# Patient Record
Sex: MALE | Race: WHITE | Employment: FULL TIME | URBAN - METROPOLITAN AREA
[De-identification: names, ages, dates, MRNs, and addresses within clinical notes are randomized per-mention and may not be internally consistent; named-entity substitution may affect disease eponyms.]

---

## 2022-10-03 SDOH — HEALTH STABILITY: PHYSICAL HEALTH: ON AVERAGE, HOW MANY MINUTES DO YOU ENGAGE IN EXERCISE AT THIS LEVEL?: 150+ MIN

## 2022-10-03 SDOH — HEALTH STABILITY: PHYSICAL HEALTH: ON AVERAGE, HOW MANY DAYS PER WEEK DO YOU ENGAGE IN MODERATE TO STRENUOUS EXERCISE (LIKE A BRISK WALK)?: 5 DAYS

## 2022-10-03 ASSESSMENT — SOCIAL DETERMINANTS OF HEALTH (SDOH)

## 2022-10-04 ENCOUNTER — OFFICE VISIT (OUTPATIENT)
Dept: PRIMARY CARE CLINIC | Age: 47
End: 2022-10-04
Payer: COMMERCIAL

## 2022-10-04 VITALS
DIASTOLIC BLOOD PRESSURE: 74 MMHG | SYSTOLIC BLOOD PRESSURE: 102 MMHG | TEMPERATURE: 98.1 F | RESPIRATION RATE: 14 BRPM | OXYGEN SATURATION: 100 % | BODY MASS INDEX: 22.41 KG/M2 | WEIGHT: 184 LBS | HEART RATE: 67 BPM | HEIGHT: 76 IN
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
## 2022-10-04 DIAGNOSIS — Z72.0 TOBACCO ABUSE: ICD-10-CM

## 2022-10-04 DIAGNOSIS — S50.861A INSECT BITE OF RIGHT FOREARM, INITIAL ENCOUNTER: Primary | ICD-10-CM

## 2022-10-04 DIAGNOSIS — L98.9 SKIN LESION OF LEFT LOWER EXTREMITY: ICD-10-CM

## 2022-10-04 PROCEDURE — 99204 OFFICE O/P NEW MOD 45 MIN: CPT | Performed by: STUDENT IN AN ORGANIZED HEALTH CARE EDUCATION/TRAINING PROGRAM

## 2022-10-04 RX ORDER — CLINDAMYCIN PHOSPHATE 10 MG/G
GEL TOPICAL
Qty: 1 EACH | Refills: 0 | Status: SHIPPED | OUTPATIENT
Start: 2022-10-04 | End: 2022-10-11

## 2022-10-04 SDOH — ECONOMIC STABILITY: FOOD INSECURITY: WITHIN THE PAST 12 MONTHS, YOU WORRIED THAT YOUR FOOD WOULD RUN OUT BEFORE YOU GOT MONEY TO BUY MORE.: NEVER TRUE

## 2022-10-04 SDOH — ECONOMIC STABILITY: FOOD INSECURITY: WITHIN THE PAST 12 MONTHS, THE FOOD YOU BOUGHT JUST DIDN'T LAST AND YOU DIDN'T HAVE MONEY TO GET MORE.: NEVER TRUE

## 2022-10-04 ASSESSMENT — ANXIETY QUESTIONNAIRES
5. BEING SO RESTLESS THAT IT IS HARD TO SIT STILL: 1
GAD7 TOTAL SCORE: 3
1. FEELING NERVOUS, ANXIOUS, OR ON EDGE: 0
2. NOT BEING ABLE TO STOP OR CONTROL WORRYING: 0
4. TROUBLE RELAXING: 1
6. BECOMING EASILY ANNOYED OR IRRITABLE: 0
3. WORRYING TOO MUCH ABOUT DIFFERENT THINGS: 1
IF YOU CHECKED OFF ANY PROBLEMS ON THIS QUESTIONNAIRE, HOW DIFFICULT HAVE THESE PROBLEMS MADE IT FOR YOU TO DO YOUR WORK, TAKE CARE OF THINGS AT HOME, OR GET ALONG WITH OTHER PEOPLE: NOT DIFFICULT AT ALL
7. FEELING AFRAID AS IF SOMETHING AWFUL MIGHT HAPPEN: 0

## 2022-10-04 ASSESSMENT — ENCOUNTER SYMPTOMS
WHEEZING: 0
ABDOMINAL PAIN: 0
SHORTNESS OF BREATH: 0

## 2022-10-04 ASSESSMENT — PATIENT HEALTH QUESTIONNAIRE - PHQ9
3. TROUBLE FALLING OR STAYING ASLEEP: 0
1. LITTLE INTEREST OR PLEASURE IN DOING THINGS: 0
10. IF YOU CHECKED OFF ANY PROBLEMS, HOW DIFFICULT HAVE THESE PROBLEMS MADE IT FOR YOU TO DO YOUR WORK, TAKE CARE OF THINGS AT HOME, OR GET ALONG WITH OTHER PEOPLE: 0
SUM OF ALL RESPONSES TO PHQ QUESTIONS 1-9: 1
SUM OF ALL RESPONSES TO PHQ QUESTIONS 1-9: 1
4. FEELING TIRED OR HAVING LITTLE ENERGY: 0
8. MOVING OR SPEAKING SO SLOWLY THAT OTHER PEOPLE COULD HAVE NOTICED. OR THE OPPOSITE, BEING SO FIGETY OR RESTLESS THAT YOU HAVE BEEN MOVING AROUND A LOT MORE THAN USUAL: 1
7. TROUBLE CONCENTRATING ON THINGS, SUCH AS READING THE NEWSPAPER OR WATCHING TELEVISION: 0
6. FEELING BAD ABOUT YOURSELF - OR THAT YOU ARE A FAILURE OR HAVE LET YOURSELF OR YOUR FAMILY DOWN: 0
SUM OF ALL RESPONSES TO PHQ9 QUESTIONS 1 & 2: 0
5. POOR APPETITE OR OVEREATING: 0
SUM OF ALL RESPONSES TO PHQ QUESTIONS 1-9: 1
9. THOUGHTS THAT YOU WOULD BE BETTER OFF DEAD, OR OF HURTING YOURSELF: 0
2. FEELING DOWN, DEPRESSED OR HOPELESS: 0
SUM OF ALL RESPONSES TO PHQ QUESTIONS 1-9: 1

## 2022-10-04 ASSESSMENT — SOCIAL DETERMINANTS OF HEALTH (SDOH): HOW HARD IS IT FOR YOU TO PAY FOR THE VERY BASICS LIKE FOOD, HOUSING, MEDICAL CARE, AND HEATING?: NOT HARD AT ALL

## 2022-10-04 NOTE — PROGRESS NOTES
Haleigh Arenas (:  1975) is a 56 y.o. male,New patient, here for evaluation of the following chief complaint(s):  New Patient (To establish care ), Insect Bite (Right antecubital space, feels the hole is getting bigger, has had it for 2 weeks ), and Mass (Left side knee area, very painful, getting more sensitive but not getting bigger /)      Patient presents today to establish care and with complaints of insect bite and skin lesion. Patient reports PMHx of depression (diagnosed 17 years ago but he's gotten over the hump). Surgical history reviewed. Family history reviewed. Patient smokes 0.5 PPD, denies drinking alcohol or recreational drug use. Insect Bite  This is a new problem. The current episode started 1 to 4 weeks ago (about 2 weeks ago). The problem has been gradually worsening. Pertinent negatives include no abdominal pain, chest pain or fever. Associated symptoms comments: Puncture wound is getting bigger, small swelling around. Nothing aggravates the symptoms. Treatments tried: sprayed it with peroxide. The treatment provided no relief. Photo in media. Lesion above left knee: He first noticed his about a year ago. There is a raised lesion that has recently become increasingly painful to touch. Photo in media. ASSESSMENT/PLAN:  1. Insect bite of right forearm, initial encounter  Assessment & Plan: This is likely from spider bite. Since it has been a couple of weeks and wound appears to be worsening, there is a risk of developing infection. Patient agreeable for trial of topical antibiotics. He was also recommended to take antihistamines if experiencing significant pruritus. Orders:  -     clindamycin (CLEOCIN-T) 1 % gel; Apply topically 2 times daily. , Disp-1 each, R-0, Normal  2. Skin lesion of left lower extremity  Assessment & Plan:  Unclear cause, however biopsy might be needed, therefore patient referred to dermatology.   Orders:  -     [facility]  3. Tobacco abuse  Assessment & Plan:  Ample time was spent in discussion about potential risks of smoking. Patient endorses understanding. However he is still in the precontemplation phase for smoking cessation. Return in about 6 weeks (around 11/15/2022) for annual physical .    SUBJECTIVE/OBJECTIVE:    Review of Systems   Constitutional:  Negative for fever. Respiratory:  Negative for shortness of breath and wheezing. Cardiovascular:  Negative for chest pain and palpitations. Gastrointestinal:  Negative for abdominal pain. Skin:         Insect bite on right forearm and skin lesion above left knee     Vitals: /74   Pulse 67   Temp 98.1 °F (36.7 °C)   Resp 14   Ht 6' 4\" (1.93 m)   Wt 184 lb (83.5 kg)   SpO2 100%   BMI 22.40 kg/m²   Physical Exam  Constitutional:       Appearance: Normal appearance. Cardiovascular:      Rate and Rhythm: Normal rate and regular rhythm. Pulses: Normal pulses. Heart sounds: Normal heart sounds. Pulmonary:      Effort: Pulmonary effort is normal.      Breath sounds: Normal breath sounds. Musculoskeletal:         General: No swelling or tenderness. Skin:     Comments: Right arm: Puncture wound diameter of 2-3 mm on right antecubital fossa with surrounding edema. Due to background red color from tattoo, difficult to tell if it is erythematous. He reports some tenderness to palpation, no drainage noted. Left leg: Raised, firm erythematous lesion just above left knee, tender to palpation, no drainage noted, clearly defined borders. Neurological:      Mental Status: He is alert and oriented to person, place, and time. Psychiatric:         Mood and Affect: Mood normal.         Behavior: Behavior normal.         No results found for this visit on 10/04/22. An electronic signature was used to authenticate this note.     Electronically signed by Nita Boswell MD on 10/4/2022 at 11:16 AM     This dictation was generated by voice recognition computer software. Although all attempts are made to edit the dictation for accuracy, there may be errors in the transcription that are not intended.

## 2022-10-04 NOTE — ASSESSMENT & PLAN NOTE
This is likely from spider bite. Since it has been a couple of weeks and wound appears to be worsening, there is a risk of developing infection. Patient agreeable for trial of topical antibiotics. He was also recommended to take antihistamines if experiencing significant pruritus.

## 2022-10-04 NOTE — LETTER
[facility]  [address] [place] [address]  Phone: [phone]  Fax: [phone]    Sofya Díaz MD        October 4, 2022     Patient: Tony Arredondo   YOB: 1975   Date of Visit: 10/4/2022       To Whom It May Concern: It is my medical opinion that Tony Arredondo may return to full duty immediately with no restrictions. If you have any questions or concerns, please don't hesitate to call. Sincerely,        Sofya Díaz MD     By:       GABY Mccain., A.M.T.      Practice Manager,  [address] [place] [address]  Kareem Suarez  P: [phone]  F: [phone]

## 2022-10-04 NOTE — ASSESSMENT & PLAN NOTE
Ample time was spent in discussion about potential risks of smoking. Patient endorses understanding. However he is still in the precontemplation phase for smoking cessation.

## 2022-11-02 ENCOUNTER — OFFICE VISIT (OUTPATIENT)
Dept: DERMATOLOGY | Age: 47
End: 2022-11-02
Payer: COMMERCIAL

## 2022-11-02 DIAGNOSIS — R21 RASH AND OTHER NONSPECIFIC SKIN ERUPTION: Primary | ICD-10-CM

## 2022-11-02 DIAGNOSIS — D48.5 NEOPLASM OF UNCERTAIN BEHAVIOR OF SKIN: ICD-10-CM

## 2022-11-02 PROCEDURE — 99204 OFFICE O/P NEW MOD 45 MIN: CPT | Performed by: INTERNAL MEDICINE

## 2022-11-02 NOTE — PATIENT INSTRUCTIONS
Thank you for visiting [facility] today! Please follow the instructions below as we discussed in clinic:      Start mupirocin ointment twice a day to areas on R arm and L neck  The bump on your left knee is likely a benign dermatofibroma. We would need to do surgery for this or inject with steroid if you want    Vinegar Soaks (acetic acid)    There are certain times when a wound is healing that it will benefit from a change in the kind of wound care. Vinegar soaks are often very helpful in finishing the wound healing. Instructions  Mix 1 tablespoon of white or yellow vinegar to 8oz. of water. Soak gauze in the solution and apply to wound area 2-3 times a day for 20 minutes. Rinse off. Pat dry with gauze. Continue soaks until follow-up appointment, unless otherwise instructed. This will assist in the healing of the wound and/or decreasing bacterial loads.

## 2022-11-02 NOTE — PROGRESS NOTES
[facility]  Rita Concepcion MD  [phone]    Date of Visit: 11/2/2022    Janelle Houser is a 56 y.o. male who presents for skin lesions. New pt    Chief Complaint:   Chief Complaint   Patient presents with    Other     Bump on knee, sore on tongue    Skin Exam     Bug bites        History of Present Illness:    Concern:  Bump on R forearm and L neck  Duration:  4 weeks  Symptoms: Itchy  Previous treatments:  Peroxide, clindamycin gel  Effect of current treatment: Not controlled    Concerns: Bump on L knee  Duration: Several months  Symptoms: Painful sometimes  Previous trmt: None    *Personal history of skin cancer: None  *Family history of skin cancer: None     Review of Systems:  Gen: Feels well, good sense of health. Skin: No new or changing moles, no history of keloids or hypertrophic scars. Past Medical History, Family History, Surgical History, Medications and Allergies reviewed. History reviewed. No pertinent past medical history. Past Surgical History:   Procedure Laterality Date    ARTHROPLASTY  09/23/2011    5th toe right foot    BACK SURGERY      ELBOW SURGERY      FEMUR SURGERY      right  from fracture    MOUTH SURGERY      all top teeth removed    OTHER SURGICAL HISTORY      laser of herniated disc L4-L5       Allergies   Allergen Reactions    Bee Venom Anaphylaxis     Outpatient Medications Marked as Taking for the 11/2/22 encounter (Office Visit) with Kristi Martin MD   Medication Sig Dispense Refill    mupirocin (BACTROBAN) 2 % ointment Apply to affected area on left neck and right arm twice a day until heals 22 g 0         Physical Examination   No acute distress. Mood clear/affect appropriate. Alert and oriented. Mucous membranes moist.  Sclera anicteric.     Limited body skin exam was conducted to include the scalp, face, lips, lids/conjunctiva, ears, neck, right and left hands and forearms, right and left leg and was normal with the following exceptions:   - Excoriated erosions on R forearm and L neck. L neck has hemorrhagic crust   - Firm pink plaque on L upper knee that umbilicates with lateral pressure      Assessment and Plan     1. Rash and other nonspecific skin eruption, R arm/L neck--not controlled  -Favor likely primary arthropod bite that has slower wound healing 2/2 [facility]  -Reocmmend:  -Avoid touching/picking areas  - Start vinegar soaks daily and then mupirocin (BACTROBAN) 2 % ointment; Apply to affected area on left neck and right arm twice a day until heals  Dispense: 22 g; Refill: 0  -Bacterial cx from L neck erosion today; future trmt pending results     2. Neoplasm of uncertain behavior of skin, L nee  Favor dermatofibroma vs. Less likely cystic nodule beneath  -Reviewed surgery as definitive trmt, but can try ILK etc  -Pt defers for now     RTC 8 weeks to check     Note is transcribed using voice recognition software. Inadvertent computerized transcription errors may be present.     Dominik Oakes MD     =

## 2022-11-08 LAB
ANAEROBIC CULTURE: ABNORMAL
GRAM STAIN RESULT: ABNORMAL
ORGANISM: ABNORMAL
ORGANISM: ABNORMAL
WOUND/ABSCESS: ABNORMAL
WOUND/ABSCESS: ABNORMAL

## 2022-11-16 ENCOUNTER — OFFICE VISIT (OUTPATIENT)
Dept: PRIMARY CARE CLINIC | Age: 47
End: 2022-11-16
Payer: COMMERCIAL

## 2022-11-16 VITALS
OXYGEN SATURATION: 97 % | HEART RATE: 68 BPM | RESPIRATION RATE: 16 BRPM | BODY MASS INDEX: 22.04 KG/M2 | HEIGHT: 76 IN | SYSTOLIC BLOOD PRESSURE: 110 MMHG | DIASTOLIC BLOOD PRESSURE: 74 MMHG | TEMPERATURE: 98.9 F | WEIGHT: 181 LBS

## 2022-11-16 DIAGNOSIS — K14.8 LESION OF TONGUE: ICD-10-CM

## 2022-11-16 DIAGNOSIS — R50.9 FEVER, UNSPECIFIED FEVER CAUSE: Primary | ICD-10-CM

## 2022-11-16 LAB
INFLUENZA VIRUS A RNA: NEGATIVE
INFLUENZA VIRUS B RNA: NEGATIVE
Lab: NORMAL
QC PASS/FAIL: NORMAL
SARS-COV-2 RDRP RESP QL NAA+PROBE: NEGATIVE

## 2022-11-16 PROCEDURE — 87635 SARS-COV-2 COVID-19 AMP PRB: CPT | Performed by: STUDENT IN AN ORGANIZED HEALTH CARE EDUCATION/TRAINING PROGRAM

## 2022-11-16 PROCEDURE — 87502 INFLUENZA DNA AMP PROBE: CPT | Performed by: STUDENT IN AN ORGANIZED HEALTH CARE EDUCATION/TRAINING PROGRAM

## 2022-11-16 PROCEDURE — 99214 OFFICE O/P EST MOD 30 MIN: CPT | Performed by: STUDENT IN AN ORGANIZED HEALTH CARE EDUCATION/TRAINING PROGRAM

## 2022-11-16 RX ORDER — AZITHROMYCIN 250 MG/1
TABLET, FILM COATED ORAL
Qty: 6 TABLET | Refills: 0 | Status: SHIPPED | OUTPATIENT
Start: 2022-11-16 | End: 2022-11-21

## 2022-11-16 RX ORDER — METHYLPREDNISOLONE 4 MG/1
TABLET ORAL
Qty: 1 KIT | Refills: 0 | Status: SHIPPED | OUTPATIENT
Start: 2022-11-16 | End: 2022-11-21

## 2022-11-16 ASSESSMENT — ENCOUNTER SYMPTOMS
SORE THROAT: 0
COUGH: 1
NAUSEA: 1

## 2022-11-16 NOTE — ASSESSMENT & PLAN NOTE
Negative for influenza and COVID-19. This could be from another viral illness. However due to waxing and waning progression, there is concern for bacterial infection. Supportive care recommended, along with trial of Zpak and steroids.

## 2022-11-16 NOTE — PROGRESS NOTES
Patient:  Janelle Houser 56 y.o. male     Date of Service: 11/16/2022       Chief complaint:   Chief Complaint   Patient presents with    Annual Exam    Fever     Since Sunday and last night, joint pain with fatigue also with cough intermittently        History of Present Illness   Patient presents today with complaints of feeling ill. This was originally intended to be an annual physical exam visit, but it will be turned into sick visit. Fever   This is a new problem. The current episode started 1 to 4 weeks ago (2 weeks ago, but then 3 days ago fever started). The problem occurs intermittently. The problem has been unchanged. The maximum temperature noted was 99 to 99.9 F. Associated symptoms include congestion, coughing (a little), muscle aches and nausea. Pertinent negatives include no sore throat. Treatments tried: theraflu. The treatment provided no relief. Risk factors: sick contacts   Sick contacts: the entire family but none felt as bad as him. Tongue lesions: patient also reports oral lesions that come and go, which started after he got his dentures about 1 year ago. These lesions are sometimes painful but but overtime they heel. He was never seen by doctor for this. Reviewof Systems:   Review of Systems   Constitutional:  Positive for fever. HENT:  Positive for congestion. Negative for sore throat. Tongue lesions   Respiratory:  Positive for cough (a little). Gastrointestinal:  Positive for nausea. Physical Exam   Vitals: /74   Pulse 68   Temp 98.9 °F (37.2 °C)   Resp 16   Ht 6' 4\" (1.93 m)   Wt 181 lb (82.1 kg)   SpO2 97%   BMI 22.03 kg/m²   Physical Exam  Constitutional:       Appearance: Normal appearance. HENT:      Mouth/Throat:      Pharynx: No oropharyngeal exudate or posterior oropharyngeal erythema. Comments: ulcer-like lesion on ventral midline on tongue. Cardiovascular:      Rate and Rhythm: Normal rate and regular rhythm.       Pulses: Normal pulses. Heart sounds: Normal heart sounds. Pulmonary:      Effort: Pulmonary effort is normal.      Breath sounds: Normal breath sounds. Musculoskeletal:         General: No swelling or tenderness. Neurological:      Mental Status: He is alert and oriented to person, place, and time. Psychiatric:         Mood and Affect: Mood normal.         Behavior: Behavior normal.          Results for POC orders placed in visit on 11/16/22   POCT Influenza A/B DNA (Alere i)   Result Value Ref Range    Influenza virus A RNA negative     Influenza virus B RNA negative        Assessment and Plan   1. Fever, unspecified fever cause  Assessment & Plan:  Negative for influenza and COVID-19. This could be from another viral illness. However due to waxing and waning progression, there is concern for bacterial infection. Supportive care recommended, along with trial of Zpak and steroids. Orders:  -     POCT Influenza A/B DNA (Alere i)  -     POCT COVID-19 Rapid, NAAT  -     methylPREDNISolone (MEDROL DOSEPACK) 4 MG tablet; Take by mouth., Disp-1 kit, R-0Normal  -     azithromycin (ZITHROMAX Z-TRINITY) 250 MG tablet; Take 2 tablets a day on first day followed by 1 tablet a day for the remaining 4 days, Disp-6 tablet, R-0Normal  2. Lesion of tongue  Assessment & Plan:  Unclear cause. Patient referred to ENT for further evaluation and management. Orders:  -     Camilla Goff MD, Otolaryngology, [facility]      I spent a total of 30 minutes on the day of the visit. Issues to address at future visit/s:     Return to Office: Return in about 4 weeks (around 12/14/2022) for annual physical .    Medication List:    Current Outpatient Medications   Medication Sig Dispense Refill    methylPREDNISolone (MEDROL DOSEPACK) 4 MG tablet Take by mouth.  1 kit 0    azithromycin (ZITHROMAX Z-TRINITY) 250 MG tablet Take 2 tablets a day on first day followed by 1 tablet a day for the remaining 4 days 6 tablet 0    mupirocin (BACTROBAN) 2 % ointment Apply to affected area on left neck and right arm twice a day until heals 22 g 0     No current facility-administered medications for this visit. Electronically signed by Kevon Yee MD on 11/16/2022 at 4:21 PM     This dictation was generated by voice recognition computer software. Although all attempts are made to edit the dictation for accuracy, there may be errors in the transcription that are not intended.

## 2022-11-16 NOTE — LETTER
[facility]  [address] [place] [address]  Phone: [phone]  Fax: [phone]    Getachew Rosales MD        November 16, 2022     Patient: Abelardo Bustos   YOB: 1975   Date of Visit: 11/16/2022       To Whom It May Concern:    Abelardo Bustos was seen in my office today, 11/16/2022. Due to his current illness, it is my medical opinion that Abelardo Bustos should remain out of work until Sunday 11/20/2022. If you have any questions or concerns, please don't hesitate to call.     Sincerely,        Getachew Rosales MD

## 2022-11-21 ENCOUNTER — OFFICE VISIT (OUTPATIENT)
Dept: ENT CLINIC | Age: 47
End: 2022-11-21

## 2022-11-21 VITALS
DIASTOLIC BLOOD PRESSURE: 83 MMHG | HEIGHT: 76 IN | HEART RATE: 59 BPM | BODY MASS INDEX: 21.68 KG/M2 | SYSTOLIC BLOOD PRESSURE: 131 MMHG | WEIGHT: 178 LBS

## 2022-11-21 DIAGNOSIS — K14.0 TONGUE ULCER: Primary | ICD-10-CM

## 2022-11-21 ASSESSMENT — ENCOUNTER SYMPTOMS
DIARRHEA: 0
FACIAL SWELLING: 0
BACK PAIN: 0
EYE DISCHARGE: 0
SINUS PRESSURE: 0
COLOR CHANGE: 0
VOICE CHANGE: 0
CHOKING: 0
RHINORRHEA: 0
STRIDOR: 0
CONSTIPATION: 0
SINUS PAIN: 0
VOMITING: 0
NAUSEA: 0
EYE PAIN: 0
SHORTNESS OF BREATH: 0
CHEST TIGHTNESS: 0
SORE THROAT: 0
COUGH: 0
APNEA: 0
BLOOD IN STOOL: 0
WHEEZING: 0
TROUBLE SWALLOWING: 0

## 2022-11-21 NOTE — PROGRESS NOTES
Subjective:      Patient ID: Jameson Alvarado is a 56 y.o. male. HPI  Chief Complaint   Patient presents in consultation from Dr. Octavio Omer    sores on gums       History of Present Illness  Head/Neck    Josphine Check is a(n) 56 y.o. male who presents with a one year history of sores on gums. Started with dentures. Dentist stated nothing he could do. Referred to primary care. Now in ENT.  Smoker  Pain: Yes  Severity: severe  Frequency: monthly  Otalgia: No  Odynophagia: No  Dysphagia: No  Dyspnea: No  Voice Changes: No  Lumps in neck: No  Hemoptysis: No  Fever: No  Chills: No  Sweats: No  Lethargy: No  Weight Loss: Yes  Modifying Factors: Smoker  Associates Signs and Symptoms: none    Patient Active Problem List   Diagnosis    Toe deformity    Tobacco abuse    Insect bite of right forearm    Skin lesion of left lower extremity    Fever    Lesion of tongue     Past Surgical History:   Procedure Laterality Date    ARTHROPLASTY  09/23/2011    5th toe right foot    BACK SURGERY      ELBOW SURGERY      FEMUR SURGERY      right  from fracture    FRACTURE SURGERY  October 1989    JOINT REPLACEMENT      MOUTH SURGERY      all top teeth removed    OTHER SURGICAL HISTORY      laser of herniated disc L4-L5     Family History   Problem Relation Age of Onset    Lung Cancer Father     Alcohol Abuse Father      Social History     Socioeconomic History    Marital status: Single     Spouse name: Not on file    Number of children: Not on file    Years of education: Not on file    Highest education level: Not on file   Occupational History    Not on file   Tobacco Use    Smoking status: Every Day     Packs/day: 1.00     Years: 17.00     Pack years: 17.00     Types: Cigarettes     Start date: 13    Smokeless tobacco: Never   Substance and Sexual Activity    Alcohol use: Not Currently     Comment: rarely    Drug use: Yes     Types: Marijuana Aloma Huong)     Comment: medical card    Sexual activity: Yes     Partners: Female   Other Topics Concern Not on file   Social History Narrative    Not on file     Social Determinants of Health     Financial Resource Strain: Low Risk     Difficulty of Paying Living Expenses: Not hard at all   Food Insecurity: No Food Insecurity    Worried About Running Out of Food in the Last Year: Never true    Ran Out of Food in the Last Year: Never true   Transportation Needs: Not on file   Physical Activity: Sufficiently Active    Days of Exercise per Week: 5 days    Minutes of Exercise per Session: 150+ min   Stress: Not on file   Social Connections: Not on file   Intimate Partner Violence: Not At Risk    Fear of Current or Ex-Partner: No    Emotionally Abused: No    Physically Abused: No    Sexually Abused: No   Housing Stability: Not on file       DRUG/FOOD ALLERGIES: Bee venom    CURRENT MEDICATIONS  Prior to Admission medications    Not on File       Lab Studies:No results found for: WBC, HGB, HCT, MCV, PLT  No results found for: GLUCOSE, BUN, CREATININE, K, NA, CL, CALCIUM  No results found for: MG  No results found for: PHOS  No results found for: ALKPHOS, ALT, AST, BILITOT, ALBUMIN, PROT, LABGLOB     Review of Systems   Constitutional:  Negative for activity change, appetite change, chills, fatigue and fever. HENT:  Negative for congestion, dental problem, drooling, ear discharge, ear pain, facial swelling, hearing loss, mouth sores, nosebleeds, postnasal drip, rhinorrhea, sinus pressure, sinus pain, sneezing, sore throat, tinnitus, trouble swallowing and voice change. Eyes:  Negative for pain, discharge and visual disturbance. Respiratory:  Negative for apnea, cough, choking, chest tightness, shortness of breath, wheezing and stridor. Cardiovascular:  Negative for palpitations. Gastrointestinal:  Negative for blood in stool, constipation, diarrhea, nausea and vomiting. Endocrine: Negative for cold intolerance, heat intolerance, polydipsia, polyphagia and polyuria.    Musculoskeletal:  Negative for back pain, gait problem, neck pain and neck stiffness. Skin:  Negative for color change, pallor, rash and wound. Allergic/Immunologic: Negative for environmental allergies, food allergies and immunocompromised state. Neurological:  Negative for dizziness, facial asymmetry, speech difficulty, light-headedness, numbness and headaches. Hematological:  Negative for adenopathy. Does not bruise/bleed easily. Psychiatric/Behavioral:  Negative for agitation, confusion, self-injury and sleep disturbance. The patient is not nervous/anxious. Objective:   Physical Exam  Constitutional:       General: He is not in acute distress. Appearance: He is well-developed. He is not ill-appearing. HENT:      Head: Normocephalic and atraumatic. Not macrocephalic and not microcephalic. No raccoon eyes, Martin's sign, abrasion, contusion, right periorbital erythema, left periorbital erythema or laceration. Hair is normal.      Jaw: No trismus. Salivary Glands: Right salivary gland is not diffusely enlarged. Left salivary gland is not diffusely enlarged. Right Ear: Hearing, tympanic membrane and external ear normal. No decreased hearing noted. No drainage, swelling or tenderness. No middle ear effusion. No mastoid tenderness. Tympanic membrane is not perforated, retracted or bulging. Tympanic membrane has normal mobility. Left Ear: Hearing, tympanic membrane and external ear normal. No decreased hearing noted. No drainage, swelling or tenderness. No middle ear effusion. No mastoid tenderness. Tympanic membrane is not perforated, retracted or bulging. Tympanic membrane has normal mobility. Ears:      Whelan exam findings: Does not lateralize. Right Rinne: AC > BC. Left Rinne: AC > BC. Nose: No nasal deformity, septal deviation, laceration, mucosal edema or rhinorrhea. Right Nostril: No epistaxis. Left Nostril: No epistaxis. Right Turbinates: Not enlarged.       Left Turbinates: Not enlarged. Right Sinus: No maxillary sinus tenderness or frontal sinus tenderness. Left Sinus: No maxillary sinus tenderness or frontal sinus tenderness. Mouth/Throat:      Lips: No lesions. Mouth: Mucous membranes are not pale, not dry and not cyanotic. No lacerations or oral lesions. Dentition: Normal dentition. No dental caries or dental abscesses. Tongue: No lesions. Palate: No mass. Pharynx: Uvula midline. No oropharyngeal exudate, posterior oropharyngeal erythema or uvula swelling. Tonsils: No tonsillar abscesses. Eyes:      General: Lids are normal. Lids are everted, no foreign bodies appreciated. Right eye: No discharge. Left eye: No discharge. Extraocular Movements:      Right eye: Normal extraocular motion and no nystagmus. Left eye: Normal extraocular motion and no nystagmus. Conjunctiva/sclera:      Right eye: No chemosis or exudate. Left eye: No chemosis or exudate. Neck:      Thyroid: No thyroid mass or thyromegaly. Vascular: Normal carotid pulses. Trachea: Trachea normal. No tracheal tenderness or tracheal deviation. Cardiovascular:      Rate and Rhythm: Normal rate and regular rhythm. Pulmonary:      Effort: No tachypnea, bradypnea or respiratory distress. Breath sounds: No stridor. Musculoskeletal:      Right shoulder: Normal range of motion. Left shoulder: Normal range of motion. Cervical back: Normal range of motion and neck supple. No edema or erythema. No muscular tenderness. Lymphadenopathy:      Head:      Right side of head: No submental, submandibular, tonsillar, preauricular, posterior auricular or occipital adenopathy. Left side of head: No submental, submandibular, tonsillar, preauricular, posterior auricular or occipital adenopathy. Cervical: No cervical adenopathy. Right cervical: No superficial, deep or posterior cervical adenopathy.      Left cervical: No superficial, deep or posterior cervical adenopathy. Skin:     General: Skin is warm and dry. Findings: No bruising, erythema, laceration or lesion. Neurological:      Mental Status: He is alert and oriented to person, place, and time. Psychiatric:         Mood and Affect: Mood is not anxious or depressed. Speech: Speech normal.         Behavior: Behavior normal.     TOngue Biopsy    Pre OP: Tongue ulcer  Post op: Same. Procedure: Biopsy Anterior tongue  Anesthetics: 1cc of 2% lido with epi    Procedure:     After verbal consent, 1 cc of 2% lidocaine with 1:100,000 epinephrine was injected into the tongue around the lesion. After anesthesia was obtained the tongue lesion was biopsied with a cup forceps without difficulty and sent for permanent pathology. Silver nitrate was applied for hemostasis. The patient tolerated well and there were no complications. I attest I performed the entire procedure myself. Assessment:       Diagnosis Orders   1. Tongue ulcer  lidocaine (XYLOCAINE) 2 % jelly              Plan:      Tongue ulcer. Call with biopsy results and plan. Rx lidocaine gel for comfort.          Shereen Giles MD

## 2022-11-21 NOTE — LETTER
[facility]  [address]  Phone: [phone]  Fax: [phone]    Cedrick Maguire MD    November 21, 2022     Kathleen Vincent MD  [address] [place] [address]    Patient: Anselmo Dutta   MR Number: [id number]   YOB: 1975   Date of Visit: 11/21/2022       Dear Kathleen Vincent: Thank you for referring Anselmo Dutta to me for evaluation/treatment. Below are the relevant portions of my assessment and plan of care. Diagnosis Orders   1. Tongue ulcer  lidocaine (XYLOCAINE) 2 % jelly            Tongue ulcer. Call with biopsy results and plan. Rx lidocaine gel for comfort. If you have questions, please do not hesitate to call me. I look forward to following Tsering Caal along with you.     Sincerely,      Cedrick Maguire MD

## 2022-11-29 ENCOUNTER — TELEPHONE (OUTPATIENT)
Dept: ENT CLINIC | Age: 47
End: 2022-11-29

## 2022-11-29 NOTE — TELEPHONE ENCOUNTER
----- Message from Gisel Long MD sent at 11/28/2022  8:30 AM EST -----  Please let Mr. Valadez know his biopsy was consistent with a benign ulcer.  I should see him back next week for a follow up exam.  Kina Aceves

## 2022-12-05 ENCOUNTER — OFFICE VISIT (OUTPATIENT)
Dept: ENT CLINIC | Age: 47
End: 2022-12-05
Payer: COMMERCIAL

## 2022-12-05 VITALS — HEIGHT: 76 IN | BODY MASS INDEX: 21.68 KG/M2 | WEIGHT: 178 LBS

## 2022-12-05 DIAGNOSIS — K14.0 TONGUE ULCER: Primary | ICD-10-CM

## 2022-12-05 PROCEDURE — 99212 OFFICE O/P EST SF 10 MIN: CPT | Performed by: OTOLARYNGOLOGY

## 2022-12-05 NOTE — PROGRESS NOTES
Subjective:      Patient ID: Tony Arredondo is a 56 y.o. male. HPI  Chief Complaint   Patient presents in consultation from Dr. Sparkle Good    sores on gums       History of Present Illness  Head/Neck    Benson Winston is a(n) 56 y.o. male who presents with a one year history of sores on gums. Started with dentures. Dentist stated nothing he could do. Referred to primary care. Now in ENT.  Smoker  Pain: Yes  Severity: severe  Frequency: monthly  Otalgia: No  Odynophagia: No  Dysphagia: No  Dyspnea: No  Voice Changes: No  Lumps in neck: No  Hemoptysis: No  Fever: No  Chills: No  Sweats: No  Lethargy: No  Weight Loss: Yes  Modifying Factors: Smoker  Associates Signs and Symptoms: none    Patient Active Problem List   Diagnosis    Toe deformity    Tobacco abuse    Insect bite of right forearm    Skin lesion of left lower extremity    Fever    Lesion of tongue     Past Surgical History:   Procedure Laterality Date    ARTHROPLASTY  09/23/2011    5th toe right foot    BACK SURGERY      ELBOW SURGERY      FEMUR SURGERY      right  from fracture    FRACTURE SURGERY  October 1989    JOINT REPLACEMENT      MOUTH SURGERY      all top teeth removed    OTHER SURGICAL HISTORY      laser of herniated disc L4-L5     Family History   Problem Relation Age of Onset    Lung Cancer Father     Alcohol Abuse Father      Social History     Socioeconomic History    Marital status: Single     Spouse name: Not on file    Number of children: Not on file    Years of education: Not on file    Highest education level: Not on file   Occupational History    Not on file   Tobacco Use    Smoking status: Every Day     Packs/day: 1.00     Years: 17.00     Pack years: 17.00     Types: Cigarettes     Start date: 13    Smokeless tobacco: Never   Substance and Sexual Activity    Alcohol use: Not Currently     Comment: rarely    Drug use: Yes     Types: Marijuana Yelitza Forte)     Comment: medical card    Sexual activity: Yes     Partners: Female   Other Topics Concern Not on file   Social History Narrative    Not on file     Social Determinants of Health     Financial Resource Strain: Low Risk     Difficulty of Paying Living Expenses: Not hard at all   Food Insecurity: No Food Insecurity    Worried About Running Out of Food in the Last Year: Never true    Ran Out of Food in the Last Year: Never true   Transportation Needs: Not on file   Physical Activity: Sufficiently Active    Days of Exercise per Week: 5 days    Minutes of Exercise per Session: 150+ min   Stress: Not on file   Social Connections: Not on file   Intimate Partner Violence: Not At Risk    Fear of Current or Ex-Partner: No    Emotionally Abused: No    Physically Abused: No    Sexually Abused: No   Housing Stability: Not on file       DRUG/FOOD ALLERGIES: Bee venom    CURRENT MEDICATIONS  Prior to Admission medications    Not on File       Lab Studies:No results found for: WBC, HGB, HCT, MCV, PLT  No results found for: GLUCOSE, BUN, CREATININE, K, NA, CL, CALCIUM  No results found for: MG  No results found for: PHOS  No results found for: ALKPHOS, ALT, AST, BILITOT, ALBUMIN, PROT, LABGLOB     Review of Systems   Constitutional:  Negative for activity change, appetite change, chills, fatigue and fever. HENT:  Negative for congestion, dental problem, drooling, ear discharge, ear pain, facial swelling, hearing loss, mouth sores, nosebleeds, postnasal drip, rhinorrhea, sinus pressure, sinus pain, sneezing, sore throat, tinnitus, trouble swallowing and voice change. Eyes:  Negative for pain, discharge and visual disturbance. Respiratory:  Negative for apnea, cough, choking, chest tightness, shortness of breath, wheezing and stridor. Cardiovascular:  Negative for palpitations. Gastrointestinal:  Negative for blood in stool, constipation, diarrhea, nausea and vomiting. Endocrine: Negative for cold intolerance, heat intolerance, polydipsia, polyphagia and polyuria.    Musculoskeletal:  Negative for back pain, gait problem, neck pain and neck stiffness. Skin:  Negative for color change, pallor, rash and wound. Allergic/Immunologic: Negative for environmental allergies, food allergies and immunocompromised state. Neurological:  Negative for dizziness, facial asymmetry, speech difficulty, light-headedness, numbness and headaches. Hematological:  Negative for adenopathy. Does not bruise/bleed easily. Psychiatric/Behavioral:  Negative for agitation, confusion, self-injury and sleep disturbance. The patient is not nervous/anxious. Objective:   Physical Exam  Constitutional:       General: He is not in acute distress. Appearance: He is well-developed. He is not ill-appearing. HENT:      Head: Normocephalic and atraumatic. Not macrocephalic and not microcephalic. No raccoon eyes, Martin's sign, abrasion, contusion, right periorbital erythema, left periorbital erythema or laceration. Hair is normal.      Jaw: No trismus. Salivary Glands: Right salivary gland is not diffusely enlarged. Left salivary gland is not diffusely enlarged. Right Ear: Hearing, tympanic membrane and external ear normal. No decreased hearing noted. No drainage, swelling or tenderness. No middle ear effusion. No mastoid tenderness. Tympanic membrane is not perforated, retracted or bulging. Tympanic membrane has normal mobility. Left Ear: Hearing, tympanic membrane and external ear normal. No decreased hearing noted. No drainage, swelling or tenderness. No middle ear effusion. No mastoid tenderness. Tympanic membrane is not perforated, retracted or bulging. Tympanic membrane has normal mobility. Ears:      Whelan exam findings: Does not lateralize. Right Rinne: AC > BC. Left Rinne: AC > BC. Nose: No nasal deformity, septal deviation, laceration, mucosal edema or rhinorrhea. Right Nostril: No epistaxis. Left Nostril: No epistaxis. Right Turbinates: Not enlarged.       Left Turbinates: Not enlarged. Right Sinus: No maxillary sinus tenderness or frontal sinus tenderness. Left Sinus: No maxillary sinus tenderness or frontal sinus tenderness. Mouth/Throat:      Lips: No lesions. Mouth: Mucous membranes are not pale, not dry and not cyanotic. No lacerations or oral lesions. Dentition: Normal dentition. No dental caries or dental abscesses. Tongue: No lesions. Palate: No mass. Pharynx: Uvula midline. No oropharyngeal exudate, posterior oropharyngeal erythema or uvula swelling. Tonsils: No tonsillar abscesses. Eyes:      General: Lids are normal. Lids are everted, no foreign bodies appreciated. Right eye: No discharge. Left eye: No discharge. Extraocular Movements:      Right eye: Normal extraocular motion and no nystagmus. Left eye: Normal extraocular motion and no nystagmus. Conjunctiva/sclera:      Right eye: No chemosis or exudate. Left eye: No chemosis or exudate. Neck:      Thyroid: No thyroid mass or thyromegaly. Vascular: Normal carotid pulses. Trachea: Trachea normal. No tracheal tenderness or tracheal deviation. Cardiovascular:      Rate and Rhythm: Normal rate and regular rhythm. Pulmonary:      Effort: No tachypnea, bradypnea or respiratory distress. Breath sounds: No stridor. Musculoskeletal:      Right shoulder: Normal range of motion. Left shoulder: Normal range of motion. Cervical back: Normal range of motion and neck supple. No edema or erythema. No muscular tenderness. Lymphadenopathy:      Head:      Right side of head: No submental, submandibular, tonsillar, preauricular, posterior auricular or occipital adenopathy. Left side of head: No submental, submandibular, tonsillar, preauricular, posterior auricular or occipital adenopathy. Cervical: No cervical adenopathy. Right cervical: No superficial, deep or posterior cervical adenopathy.      Left cervical: No superficial, deep or posterior cervical adenopathy. Skin:     General: Skin is warm and dry. Findings: No bruising, erythema, laceration or lesion. Neurological:      Mental Status: He is alert and oriented to person, place, and time. Psychiatric:         Mood and Affect: Mood is not anxious or depressed. Speech: Speech normal.         Behavior: Behavior normal.     Tongue:  Lesion healing nicely, now about 2mm. FINAL DIAGNOSIS:     Tongue ulcer, biopsy:      - Ulcer bed with associated squamous atypia. See comment. COMMENT:   The findings are those of an ulcer bed with adjacent squamous   mucosa showing atypia most suggestive of reactive/reparative change. A   PAS stain is performed and interpreted as negative for fungal elements. A malignant process is not identified. Clinical correlation is   essential.     LUTHER/LUTHER        Assessment:       Diagnosis Orders   1. Tongue ulcer  lidocaine (XYLOCAINE) 2 % jelly              Plan:      Tongue ulcer. Healing. Continue gel. Follow up in 6 weeks.      Pandora Cockayne, MD

## 2023-01-03 ENCOUNTER — OFFICE VISIT (OUTPATIENT)
Dept: DERMATOLOGY | Age: 48
End: 2023-01-03
Payer: COMMERCIAL

## 2023-01-03 DIAGNOSIS — D48.5 NEOPLASM OF UNCERTAIN BEHAVIOR OF SKIN: ICD-10-CM

## 2023-01-03 DIAGNOSIS — L28.0 LICHEN SIMPLEX CHRONICUS: Primary | ICD-10-CM

## 2023-01-03 PROCEDURE — 99213 OFFICE O/P EST LOW 20 MIN: CPT | Performed by: INTERNAL MEDICINE

## 2023-01-03 NOTE — PROGRESS NOTES
[facility]  Danay Barr MD  [phone]    Date of Visit: 1/3/2023  LV: 11/2/2022    Rhiannon Easton is a 56 y.o. male who presents for skin lesion. Chief Complaint:   Chief Complaint   Patient presents with    Follow-up     Follow up bump on left knee        History of Present Illness:    Concern:  LSC R forearm and L neck  Duration:  4 weeks  Symptoms: Itchy  Previous treatments:  Peroxide, clindamycin gel  Current trmt:  -Vinegar soaks + mupirocin  Effect of current treatment: Resolved    Concerns: Bump on L knee favored to be DF vs. Less likely cyst   Duration: Several months  Symptoms: Painful sometimes when bumped   Previous trmt: None, no changes from last visit     *Personal history of skin cancer: None  *Family history of skin cancer: None     Review of Systems:  Gen: Feels well, good sense of health. Skin: No new or changing moles, no history of keloids or hypertrophic scars. Past Medical History, Family History, Surgical History, Medications and Allergies reviewed. Past Medical History:   Diagnosis Date    Anxiety      Past Surgical History:   Procedure Laterality Date    ARTHROPLASTY  09/23/2011    5th toe right foot    BACK SURGERY      ELBOW SURGERY      FEMUR SURGERY      right  from fracture    FRACTURE SURGERY  October 1989    JOINT REPLACEMENT      MOUTH SURGERY      all top teeth removed    OTHER SURGICAL HISTORY      laser of herniated disc L4-L5       Allergies   Allergen Reactions    Bee Venom Anaphylaxis     No outpatient medications have been marked as taking for the 1/3/23 encounter (Office Visit) with Melonie Reyes MD.     Social: Manages print making place     Physical Examination   No acute distress. Mood clear/affect appropriate. Alert and oriented. Mucous membranes moist.  Sclera anicteric.     Limited body skin exam was conducted to include the scalp, face, lips, lids/conjunctiva, ears, neck, right and left hands and forearms, right and left leg and was normal with the following exceptions:   -LSC on arms resolved   - Firm pink plaque on L upper knee that umbilicates with lateral pressure measuring 1cm x 1.1 cm       Assessment and Plan     1. LSC, R arm/L neck--resolved   -Favor likely primary arthropod bite that has slower wound healing 2/2 LSC  -Resolved       2. Neoplasm of uncertain behavior of skin, L knee  Favor dermatofibroma vs. Less likely cystic nodule beneath  -Reviewed surgery as definitive trmt which patient will think about and schedule at a time when he could take downtime at work   -Reviewed with excision we would send to path to confirm diagnosis. RTC for surgical excision of lesion on L knee    Note is transcribed using voice recognition software. Inadvertent computerized transcription errors may be present.     Chase Soni MD

## 2023-01-03 NOTE — PATIENT INSTRUCTIONS
[facility]!  Please follow the instructions below as we discussed in clinic:      You can call when you want to have surgical removal of bump on left knee

## 2024-05-26 ENCOUNTER — APPOINTMENT (OUTPATIENT)
Age: 49
End: 2024-05-26
Attending: EMERGENCY MEDICINE
Payer: COMMERCIAL

## 2024-05-26 ENCOUNTER — HOSPITAL ENCOUNTER (EMERGENCY)
Age: 49
Discharge: HOME | End: 2024-05-26
Attending: EMERGENCY MEDICINE
Payer: COMMERCIAL

## 2024-05-26 VITALS
HEART RATE: 51 BPM | HEIGHT: 76 IN | DIASTOLIC BLOOD PRESSURE: 81 MMHG | BODY MASS INDEX: 23.45 KG/M2 | RESPIRATION RATE: 12 BRPM | SYSTOLIC BLOOD PRESSURE: 127 MMHG | OXYGEN SATURATION: 97 % | TEMPERATURE: 98.1 F | WEIGHT: 192.6 LBS

## 2024-05-26 DIAGNOSIS — S62.353A NONDISPLACED FRACTURE OF SHAFT OF THIRD METACARPAL BONE, LEFT HAND, INITIAL ENCOUNTER FOR CLOSED FRACTURE: Primary | ICD-10-CM

## 2024-05-26 PROCEDURE — 2580000003 HC RX 258

## 2024-05-26 PROCEDURE — 29125 APPL SHORT ARM SPLINT STATIC: CPT

## 2024-05-26 PROCEDURE — 73130 X-RAY EXAM OF HAND: CPT

## 2024-05-26 PROCEDURE — 6360000002 HC RX W HCPCS: Performed by: EMERGENCY MEDICINE

## 2024-05-26 PROCEDURE — 96372 THER/PROPH/DIAG INJ SC/IM: CPT

## 2024-05-26 PROCEDURE — 99284 EMERGENCY DEPT VISIT MOD MDM: CPT

## 2024-05-26 RX ORDER — WATER 10 ML/10ML
INJECTION INTRAMUSCULAR; INTRAVENOUS; SUBCUTANEOUS
Status: COMPLETED
Start: 2024-05-26 | End: 2024-05-26

## 2024-05-26 RX ORDER — NAPROXEN 500 MG/1
500 TABLET ORAL 2 TIMES DAILY
Qty: 15 TABLET | Refills: 0 | Status: SHIPPED | OUTPATIENT
Start: 2024-05-26

## 2024-05-26 RX ORDER — HYDROCODONE BITARTRATE AND ACETAMINOPHEN 5; 325 MG/1; MG/1
1 TABLET ORAL EVERY 6 HOURS PRN
Qty: 6 TABLET | Refills: 0 | Status: SHIPPED | OUTPATIENT
Start: 2024-05-26 | End: 2024-05-26

## 2024-05-26 RX ORDER — CEFAZOLIN SODIUM 1 G/3ML
1000 INJECTION, POWDER, FOR SOLUTION INTRAMUSCULAR; INTRAVENOUS ONCE
Status: COMPLETED | OUTPATIENT
Start: 2024-05-26 | End: 2024-05-26

## 2024-05-26 RX ORDER — AMOXICILLIN AND CLAVULANATE POTASSIUM 875; 125 MG/1; MG/1
1 TABLET, FILM COATED ORAL 2 TIMES DAILY
Qty: 14 TABLET | Refills: 0 | Status: SHIPPED | OUTPATIENT
Start: 2024-05-26 | End: 2024-06-02

## 2024-05-26 RX ORDER — AMOXICILLIN AND CLAVULANATE POTASSIUM 875; 125 MG/1; MG/1
1 TABLET, FILM COATED ORAL 2 TIMES DAILY
Qty: 14 TABLET | Refills: 0 | Status: SHIPPED | OUTPATIENT
Start: 2024-05-26 | End: 2024-05-26

## 2024-05-26 RX ADMIN — CEFAZOLIN 1000 MG: 1 INJECTION, POWDER, FOR SOLUTION INTRAMUSCULAR; INTRAVENOUS at 14:56

## 2024-05-26 RX ADMIN — WATER 10 ML: 1 INJECTION INTRAMUSCULAR; INTRAVENOUS; SUBCUTANEOUS at 14:56

## 2024-05-26 NOTE — ED PROVIDER NOTES
[facility] EMERGENCY DEPT  EMERGENCY DEPARTMENT ENCOUNTER      Pt Name: Brad Valadez  MRN: [id number]  Birthdate 1975  Date of evaluation: 5/26/2024  Provider: SAMANTA LY DO    CHIEF COMPLAINT       Chief Complaint   Patient presents with    Hand Injury     Patient states he was driving posts into the ground and smashed his left hand with a hammer.          HISTORY OF PRESENT ILLNESS   (Location/Symptom, Timing/Onset, Context/Setting, Quality, Duration, Modifying Factors, Severity)  Note limiting factors.   Brad Valadez is a 48 y.o. male who presents to the emergency department with a complaint of an injury to the left hand that he sustained 2 hours prior to arrival when he was hammering a post into the ground with a hammer using his right hand and accidentally hit the dorsal aspect of his left hand.  He complains of pain over the mid third and fourth metacarpal surface.  He sustained a tiny abrasion.  He denies any recent illness fever or chills.  No weakness or numbness.  No prior injury to the left hand.  He is right-hand dominant.  Tetanus immunization is up-to-date.  He does not take any anticoagulants    Nursing Notes were reviewed.    HPI        REVIEW OF SYSTEMS    (2-9 systems for level 4, 10 or more for level 5)       Constitutional: Negative for fever or chills.       Respiratory: Negative for shortness of breath or dyspnea on exertion.     Cardiovascular: Negative for chest pain.   Gastrointestinal: Negative for abdominal pain.  Negative for vomiting or diarrhea.   Neurological: Negative for headache.    Genitourinary: Negative for flank pain.  Negative for dysuria.  Negative for hematuria.           All systems are reviewed and are negative except for those listed above in the history of present illness and ROS.        PAST MEDICAL HISTORY     Past Medical History:   Diagnosis Date    Anxiety          SURGICAL HISTORY       Past Surgical History:   Procedure Laterality

## 2024-05-26 NOTE — DISCHARGE INSTRUCTIONS
Follow-up with orthopedics in 1 to 2 days for reexamination.  Call today for an appointment.    Keep splint clean and dry.  Do not get it wet.  Leave splint in place until seen by orthopedics.    Apply ice to the affected area every 2 hours for 20 to 30 minutes.  Keep affected area elevated.    If condition worsens or new symptoms develop, return immediately to the emergency department.

## 2024-05-28 ENCOUNTER — OFFICE VISIT (OUTPATIENT)
Dept: ORTHOPEDIC SURGERY | Age: 49
End: 2024-05-28

## 2024-05-28 ENCOUNTER — TELEPHONE (OUTPATIENT)
Dept: ORTHOPEDIC SURGERY | Age: 49
End: 2024-05-28

## 2024-05-28 VITALS — WEIGHT: 192 LBS | HEIGHT: 76 IN | BODY MASS INDEX: 23.38 KG/M2

## 2024-05-28 DIAGNOSIS — M79.642 LEFT HAND PAIN: Primary | ICD-10-CM

## 2024-05-28 DIAGNOSIS — S62.353A CLOSED NONDISPLACED FRACTURE OF SHAFT OF THIRD METACARPAL BONE OF LEFT HAND, INITIAL ENCOUNTER: ICD-10-CM

## 2024-05-28 SDOH — HEALTH STABILITY: PHYSICAL HEALTH: ON AVERAGE, HOW MANY DAYS PER WEEK DO YOU ENGAGE IN MODERATE TO STRENUOUS EXERCISE (LIKE A BRISK WALK)?: 6 DAYS

## 2024-05-28 SDOH — HEALTH STABILITY: PHYSICAL HEALTH: ON AVERAGE, HOW MANY MINUTES DO YOU ENGAGE IN EXERCISE AT THIS LEVEL?: 30 MIN

## 2024-05-31 NOTE — PROGRESS NOTES
ORTHOPAEDIC SURGERY INITIAL EVALUATION NOTE  Chief Complaint   Patient presents with    Follow-up     NP JAIR HAND       HISTORY OF PRESENT ILLNESS:  48-year-old male presents for evaluation of his left hand.  He is right-hand dominant.  He was trying to put a post in the ground and accidentally hit his left hand with a hammer.  He struck the hand on the dorsal aspect.  He had severe pain at the time of the injury.  He reports some dysesthesias into his thumb.  He has pain mostly over the ring finger.  He rates his pain 8 out of 10.  It is well localized.  It does not radiate.  He has been using naproxen which has not been helping.  He presented to the emergency department at [facility] on 5/26/2024.  X-rays demonstrated a nondisplaced fourth metacarpal shaft fracture.  He was placed into a well-padded splint and provided with orthopedic follow-up.    Past Medical History:   Diagnosis Date    Anxiety        Current Outpatient Medications   Medication Sig Dispense Refill    naproxen (NAPROSYN) 500 MG tablet Take 1 tablet by mouth 2 times daily 15 tablet 0    amoxicillin-clavulanate (AUGMENTIN) 875-125 MG per tablet Take 1 tablet by mouth 2 times daily for 7 days 14 tablet 0     No current facility-administered medications for this visit.        Past Surgical History:   Procedure Laterality Date    ARTHROPLASTY  09/23/2011    5th toe right foot    BACK SURGERY      ELBOW SURGERY      FEMUR SURGERY      right  from fracture    FRACTURE SURGERY  October 1989    JOINT REPLACEMENT      MOUTH SURGERY      all top teeth removed    OTHER SURGICAL HISTORY      laser of herniated disc L4-L5       Allergies   Allergen Reactions    Bee Venom Anaphylaxis       Family History   Problem Relation Age of Onset    Lung Cancer Father     Alcohol Abuse Father        Social History     Socioeconomic History    Marital status:      Spouse name: Not on file    Number of children: Not on file    Years of education: Not on